# Patient Record
Sex: FEMALE | Race: WHITE | NOT HISPANIC OR LATINO | URBAN - METROPOLITAN AREA
[De-identification: names, ages, dates, MRNs, and addresses within clinical notes are randomized per-mention and may not be internally consistent; named-entity substitution may affect disease eponyms.]

---

## 2023-11-14 ENCOUNTER — APPOINTMENT (OUTPATIENT)
Dept: WOMENS IMAGING | Facility: HOSPITAL | Age: 43
End: 2023-11-14
Payer: COMMERCIAL

## 2023-11-14 PROCEDURE — 76642 ULTRASOUND BREAST LIMITED: CPT | Performed by: RADIOLOGY

## 2023-11-14 PROCEDURE — G0279 TOMOSYNTHESIS, MAMMO: HCPCS | Performed by: RADIOLOGY

## 2023-11-14 PROCEDURE — 77066 DX MAMMO INCL CAD BI: CPT | Performed by: RADIOLOGY

## 2023-11-14 PROCEDURE — 77062 BREAST TOMOSYNTHESIS BI: CPT | Performed by: RADIOLOGY

## 2023-12-22 ENCOUNTER — TELEPHONE (OUTPATIENT)
Dept: SURGERY | Facility: CLINIC | Age: 43
End: 2023-12-22
Payer: COMMERCIAL

## 2023-12-22 NOTE — TELEPHONE ENCOUNTER
New patient chart prepared for Dr Jessica Foster to review. New patient referral for bilateral breasts cysts.

## 2023-12-29 ENCOUNTER — TELEPHONE (OUTPATIENT)
Dept: SURGERY | Facility: CLINIC | Age: 43
End: 2023-12-29
Payer: COMMERCIAL

## 2023-12-29 NOTE — TELEPHONE ENCOUNTER
Spoke to pt and got her scheduled for a new pt appt with cindy ward on 01/11 for bilateral breast cyst       Verified address with patient   Pt stated understanding   Mailed out new pt paperwork

## 2024-01-09 NOTE — PROGRESS NOTES
[facility]     Referring Provider: Tati Laguerre MD     Chief complaint:  mari breast cyst     HPI: Ms. Juju Aleman is a 42 yo woman, seen at the request of Tati Laguerre MD, for mari breast cyst    I personally reviewed her records and summarized her relevant breast history/imagin2022 bilateral screening mammogram at [place]  The breasts are heterogeneously dense, which may obscure small masses.   Finding 1:  There is a focal asymmetry in the medial aspect of the right breast at mid depth.   Finding 2:  There is a focal asymmetry in the lateral aspect of the left breast at posterior depth.   Tomosynthesis was utilized for this examination.   IMPRESSION:   Finding 1:  Focal asymmetry in the right breast requires additional evaluation. Recommend additional mammographic imaging and possible breast ultrasound.   Finding 2:  Focal asymmetry in the left breast requires additional evaluation. Recommend additional mammographic imaging and possible breast ultrasound.   BI-RADS Category 0:     2022 bilateral diagnostic mammogram and left limited ultrasound at [place]  MAMMOGRAM FINDINGS: Previously seen focal asymmetry within the left breast persists is an obscured, oval mass within the lateral left breast, approximately 3 cm from the nipple. This is favored to represent a cyst  Previously seen focal asymmetry within the right breast centrally persists on spot compression imaging as an oval mass measuring approximately 1 cm at the 3 to 4:00 position.   ULTRASOUND FINDINGS: Corresponding to the mass seen within the left breast, there is a benign-appearing cyst at the 4:00 position, 3 7 m from the nipple measuring up to 1.1 cm.   Corresponding to the mass seen within the right breast there is a benign-appearing cluster of cysts at the 4:00 position, 3 7 m from the nipple measuring up to 1.1 cm.   IMPRESSION:  Benign-appearing cyst and cluster of cysts correspond to the mammographic findings  in question bilaterally.   RECOMMENDATION(S):    BI-RADS Category 2     11/14/2023 bilateral diagnostic mammogram at [facility]  The breasts are heterogeneously dense, which may obscure small masses.  Finding 1:  There are multiple similar oval masses with circumscribed margins seen in both breasts.  Some have  enlarged in size and some have decreased in size since prior exam(s).  This is a typically benign pattern  consistent with history of cysts.  The patient indicates palpable lump or thickening in both breasts.  No suspicious masses, suspicious  microcalcifications or architectural distortions are identified on mammography.  BILATERAL REALTIME LIMITED BREAST ULTRASOUND  High resolution real-time ultrasound scanning was performed by the ultrasound technologist. Still images were  obtained by the ultrasound technologist and submitted for radiologist review.  Finding 2:  There is an oval elongated anechoic simple cyst with well defined, thin margins measuring 19 mm seen in  the 5:30 o'clock region of the right breast.  This correlates to a palpable area.  Finding 3:  The patient indicates palpable lump or thickening in the right breast at 11:00.  Dense, normal  appearing parenchyma is identified.  There is no evidence of any solid mass or abnormal cystic elements.  Finding 4:  There is an oval anechoic simple cyst with well defined, thin margins measuring 15 mm seen in the left  breast at 3 o'clock located 3 centimeters from the nipple.  This correlates to a palpable area.  No internal  vascularity identified by Doppler.  No solid or suspicious sonographic abnormalities are seen.  Finding 5:  There is an oval anechoic simple cyst with well defined, thin margins measuring 23 mm seen in the left  breast at 6 o'clock located 3 centimeters from the nipple.  This also correlates to a palpable area. No solid or  suspicious sonographic abnormalities are seen.  IMPRESSION:  Finding 1:  Masses in both breasts are  benign-negative.  Finding 2:  Simple cyst in the right breast is benign-negative. Although lesion is a cyst, aspiration could be  performed for patient comfort.  Finding 3:  Area in the right breast is benign-negative. In view of the negative findings, clinical follow up is  recommended as needed. Should a lump persist or worsen, surgical consultation would be recommended.  Finding 4:  Simple cyst in the left breast at 3 o'clock located 3 centimeters from the nipple is benign-negative.  Finding 5:  Simple cyst in the left breast at 6 o'clock located 3 centimeters from the nipple is benign-negative.  Although lesion is a cyst, aspiration could be performed for patient comfort.  Screening mammogram in 1 year is recommended.  Clinical follow up is recommended.  The patient was mailed a notification letter.  BI-RADS Category 2: Benign Finding(s)      She denies any family history of breast or ovarian cancer.     Today she presents with concerns regarding bilateral breast cysts that are painful and would like to have them aspirated.  She is have several cyst throughout her lifetime but she has not had any aspirated 4.  The most concerning 1 is over 2 cm in the left breast that is quite painful.  She denies any breast skin changes, or nipple discharge.         Review of Systems - Oncology    Medications:  No current outpatient medications on file.    Allergies:  Not on File    Medical history:  No past medical history on file.    Surgical History:  No past surgical history on file.    Family History:  No family history on file.    Social History:   Social History     Socioeconomic History    Marital status: Unknown     Patient drinks 1 servings of caffeine per day.       GYNECOLOGIC HISTORY:   G: 3. P: 2. AB: 1.  Last menstrual period: 2023  Age at menarche: 13  Age at first childbirth: 22  Lactation/How lon months   Age at menopause: N/A  Total years of oral contraceptive use: 10 + years  Total years of hormone  replacement therapy: N/A      Physical Exam  There were no vitals filed for this visit.  ECOG 0 - Asymptomatic  General: NAD, well appearing  Psych: a&o x 3, normal mood and affect  Eyes: EOMI, no scleral icterus  ENMT: neck supple without masses or thyromegaly, mucus membranes moist  Resp: normal effort, CTAB  CV: RRR, no murmurs, no edema   GI: soft, NT, ND  MSK: normal gait, normal ROM in bilateral shoulders  Lymph nodes: no cervical, supraclavicular or axillary lymphadenopathy  Breast: symmetric, small  Right: No visible abnormalities on inspection while seated, with arms raised or hands on hips. No masses, skin changes, or nipple abnormalities.  3:00 3 cm from the nipple 1.5 cm cyst  Left: No visible abnormalities on inspection while seated, with arms raised or hands on hips. No masses, skin changes, or nipple abnormalities.  3:00 3 cm from the nipple 1.5 cm cyst.  6:00 1.5 cm from nipple 2 cm cyst      Assessment:    Chip breast cyst  Dense breast    Discussion:  We discussed her history of multiple bilateral cysts. I explained that cysts are benign and no intervention is necessary if they are asymptomatic. I also explained that her history of cysts does not increase her lifetime risk of breast cancer  Breast density describes how the breasts look on a mammogram.  Breast and connective tissue are denser than fat and this difference shows up on the mammogram.  Young women often have dense breasts.  As we age, breast become less dense.  Dense breast can make it harder to find breast cancer on the mammogram.  Women with high breast density have an increased risk of breast cancer.  Educational materials regarding breast density were given and reviewed.  Tomosynthesis imaging will be completed with next screening study.      Plan:  Exam in 3 mons   2. Monthly self breast exams  3. Rto if any new concerns      HERMILO Weeks    I have spent 30 mins in face to face time with the patient and in chart  review.    CC:  MD Jose Francisco Mccabe Jeannette, APRN EMR Dragon/transcription disclaimer:  Dictated using Dragon dictation

## 2024-01-11 ENCOUNTER — OFFICE VISIT (OUTPATIENT)
Dept: SURGERY | Facility: CLINIC | Age: 44
End: 2024-01-11
Payer: COMMERCIAL

## 2024-01-11 ENCOUNTER — TELEPHONE (OUTPATIENT)
Dept: SURGERY | Facility: CLINIC | Age: 44
End: 2024-01-11
Payer: COMMERCIAL

## 2024-01-11 VITALS
SYSTOLIC BLOOD PRESSURE: 118 MMHG | HEIGHT: 70 IN | BODY MASS INDEX: 20.76 KG/M2 | WEIGHT: 145 LBS | HEART RATE: 72 BPM | OXYGEN SATURATION: 98 % | DIASTOLIC BLOOD PRESSURE: 74 MMHG

## 2024-01-11 DIAGNOSIS — N60.02 SOLITARY CYST OF LEFT BREAST: ICD-10-CM

## 2024-01-11 DIAGNOSIS — N60.01 SOLITARY CYST OF RIGHT BREAST: ICD-10-CM

## 2024-01-11 PROCEDURE — 99203 OFFICE O/P NEW LOW 30 MIN: CPT | Performed by: NURSE PRACTITIONER

## 2024-01-11 RX ORDER — MULTIPLE VITAMINS W/ MINERALS TAB 9MG-400MCG
1 TAB ORAL DAILY
COMMUNITY

## 2024-01-11 NOTE — TELEPHONE ENCOUNTER
Spoke to pt and let her know I scheduled her cyst aspirations at [facility] on 01/19 @ 9am     Sent message to pt via [employer]   Good Afternoon   I scheduled your Cyst Aspirations at [facility] on 01/19 at 9am and their number is [phone] option 3    If you need anything or have any questions please give me a call   Thanks   Diana MOTTA

## 2024-01-19 ENCOUNTER — APPOINTMENT (OUTPATIENT)
Dept: WOMENS IMAGING | Facility: HOSPITAL | Age: 44
End: 2024-01-19
Payer: COMMERCIAL

## 2024-01-19 PROCEDURE — 76942 ECHO GUIDE FOR BIOPSY: CPT | Performed by: RADIOLOGY

## 2024-01-19 PROCEDURE — 19001 PUNCTURE ASPIR CYST BRST EA: CPT | Performed by: RADIOLOGY

## 2024-01-19 PROCEDURE — 19000 PUNCTURE ASPIR CYST BREAST: CPT | Performed by: RADIOLOGY

## 2025-01-30 ENCOUNTER — APPOINTMENT (OUTPATIENT)
Dept: WOMENS IMAGING | Facility: HOSPITAL | Age: 45
End: 2025-01-30
Payer: COMMERCIAL

## 2025-01-30 PROCEDURE — 77062 BREAST TOMOSYNTHESIS BI: CPT | Performed by: RADIOLOGY

## 2025-01-30 PROCEDURE — 76642 ULTRASOUND BREAST LIMITED: CPT | Performed by: RADIOLOGY

## 2025-01-30 PROCEDURE — 77066 DX MAMMO INCL CAD BI: CPT | Performed by: RADIOLOGY

## 2025-01-30 PROCEDURE — G0279 TOMOSYNTHESIS, MAMMO: HCPCS | Performed by: RADIOLOGY
